# Patient Record
Sex: FEMALE | Race: WHITE | NOT HISPANIC OR LATINO | Employment: FULL TIME | URBAN - NONMETROPOLITAN AREA
[De-identification: names, ages, dates, MRNs, and addresses within clinical notes are randomized per-mention and may not be internally consistent; named-entity substitution may affect disease eponyms.]

---

## 2020-11-28 ENCOUNTER — HOSPITAL ENCOUNTER (EMERGENCY)
Facility: HOSPITAL | Age: 20
Discharge: HOME | End: 2020-11-28
Attending: EMERGENCY MEDICINE
Payer: COMMERCIAL

## 2020-11-28 VITALS
OXYGEN SATURATION: 99 % | HEIGHT: 62 IN | RESPIRATION RATE: 18 BRPM | DIASTOLIC BLOOD PRESSURE: 95 MMHG | WEIGHT: 291 LBS | BODY MASS INDEX: 53.55 KG/M2 | TEMPERATURE: 98 F | SYSTOLIC BLOOD PRESSURE: 142 MMHG | HEART RATE: 80 BPM

## 2020-11-28 DIAGNOSIS — M62.830 MUSCLE SPASM OF BACK: Primary | ICD-10-CM

## 2020-11-28 PROCEDURE — 25000003 PHARM REV CODE 250: Performed by: EMERGENCY MEDICINE

## 2020-11-28 PROCEDURE — 99284 EMERGENCY DEPT VISIT MOD MDM: CPT | Mod: 25

## 2020-11-28 PROCEDURE — 63600175 PHARM REV CODE 636 W HCPCS: Performed by: EMERGENCY MEDICINE

## 2020-11-28 PROCEDURE — 96372 THER/PROPH/DIAG INJ SC/IM: CPT | Mod: 59

## 2020-11-28 RX ORDER — KETOROLAC TROMETHAMINE 30 MG/ML
60 INJECTION, SOLUTION INTRAMUSCULAR; INTRAVENOUS
Status: COMPLETED | OUTPATIENT
Start: 2020-11-28 | End: 2020-11-28

## 2020-11-28 RX ORDER — METHOCARBAMOL 500 MG/1
1000 TABLET, FILM COATED ORAL
Status: COMPLETED | OUTPATIENT
Start: 2020-11-28 | End: 2020-11-28

## 2020-11-28 RX ORDER — METHOCARBAMOL 500 MG/1
1000 TABLET, FILM COATED ORAL 3 TIMES DAILY
Qty: 30 TABLET | Refills: 0 | Status: SHIPPED | OUTPATIENT
Start: 2020-11-28 | End: 2020-12-03

## 2020-11-28 RX ADMIN — KETOROLAC TROMETHAMINE 60 MG: 30 INJECTION, SOLUTION INTRAMUSCULAR at 08:11

## 2020-11-28 RX ADMIN — METHOCARBAMOL 1000 MG: 500 TABLET ORAL at 08:11

## 2020-11-28 NOTE — Clinical Note
"Gayatri Camejowen Masterson was seen and treated in our emergency department on 11/28/2020.  She may return to work on 11/30/2020.       If you have any questions or concerns, please don't hesitate to call.      MARÍA Quintero RN    "

## 2020-11-28 NOTE — ED PROVIDER NOTES
Encounter Date: 11/28/2020       History     Chief Complaint   Patient presents with    Fall     I fell in the bathtub yesterday and hurt my lower back.     This is a 20-year-old white female that fell in the bathtub last night injuring her lower back.  Patient complaining of muscle spasms to the left side of back.  No other injuries.  Steady gait.  No loss of consciousness.  Denies any sensory deficits, ray shoes with bladder or bowel function        Review of patient's allergies indicates:   Allergen Reactions    Amoxicillin-pot clavulanate      rash    Cefprozil      rash    Sulfa (sulfonamide antibiotics)      rash     History reviewed. No pertinent past medical history.  History reviewed. No pertinent surgical history.  History reviewed. No pertinent family history.  Social History     Tobacco Use    Smoking status: Never Smoker   Substance Use Topics    Alcohol use: Not on file    Drug use: Not on file     Review of Systems   Constitutional: Negative for fever.   HENT: Negative for sore throat.    Respiratory: Negative for shortness of breath.    Cardiovascular: Negative for chest pain.   Gastrointestinal: Negative for nausea.   Genitourinary: Negative for dysuria.   Musculoskeletal: Positive for back pain.   Skin: Negative for rash.   Neurological: Negative for weakness.   Hematological: Does not bruise/bleed easily.   All other systems reviewed and are negative.      Physical Exam     Initial Vitals   BP Pulse Resp Temp SpO2   11/28/20 0829 11/28/20 0829 11/28/20 0827 11/28/20 0829 11/28/20 0829   (!) 142/95 80 18 98.1 °F (36.7 °C) 99 %      MAP       --                Physical Exam    Nursing note and vitals reviewed.  Constitutional: She appears well-developed and well-nourished. She is not diaphoretic. No distress.   HENT:   Head: Normocephalic and atraumatic.   Eyes: Conjunctivae and EOM are normal. Pupils are equal, round, and reactive to light.   Neck: Normal range of motion. Neck supple.    Cardiovascular: Normal rate, regular rhythm, normal heart sounds and intact distal pulses.   No murmur heard.  Pulmonary/Chest: Breath sounds normal. No respiratory distress. She has no wheezes. She has no rhonchi. She has no rales. She exhibits no tenderness.   Abdominal: Soft. Bowel sounds are normal.   Musculoskeletal: Normal range of motion. No tenderness or edema.      Comments: Left-sided muscular back spasms noted, neurovascularly intact, no sensory deficits, steady gait, no bony tenderness, no radiation   Neurological: She is alert and oriented to person, place, and time. She has normal strength and normal reflexes. No cranial nerve deficit. GCS score is 15. GCS eye subscore is 4. GCS verbal subscore is 5. GCS motor subscore is 6.   Skin: Skin is warm and dry. Capillary refill takes less than 2 seconds.         ED Course   Procedures  Labs Reviewed - No data to display       Imaging Results    None          Medical Decision Making:   Differential Diagnosis:   Back spasm                             Clinical Impression:     ICD-10-CM ICD-9-CM   1. Muscle spasm of back  M62.830 724.8                          ED Disposition Condition    Discharge Stable        ED Prescriptions     Medication Sig Dispense Start Date End Date Auth. Provider    methocarbamoL (ROBAXIN) 500 MG Tab Take 2 tablets (1,000 mg total) by mouth 3 (three) times daily. for 5 days 30 tablet 11/28/2020 12/3/2020 Jr Wakefield MD        Follow-up Information     Follow up With Specialties Details Why Contact Info    Primary care physician  In 2 days                                         Jr Wakefield MD  11/28/20 8310

## 2020-12-08 ENCOUNTER — LAB VISIT (OUTPATIENT)
Dept: LAB | Facility: HOSPITAL | Age: 20
End: 2020-12-08
Attending: PHYSICIAN ASSISTANT
Payer: COMMERCIAL

## 2020-12-08 DIAGNOSIS — I10 ESSENTIAL HYPERTENSION, MALIGNANT: ICD-10-CM

## 2020-12-08 DIAGNOSIS — R55 SYNCOPE AND COLLAPSE: Primary | ICD-10-CM

## 2020-12-08 LAB
ALBUMIN SERPL BCP-MCNC: 3.6 G/DL (ref 3.5–5.2)
ALP SERPL-CCNC: 64 U/L (ref 55–135)
ALT SERPL W/O P-5'-P-CCNC: 24 U/L (ref 10–44)
ANION GAP SERPL CALC-SCNC: 3 MMOL/L (ref 8–16)
AST SERPL-CCNC: 12 U/L (ref 10–40)
BASOPHILS # BLD AUTO: 0.04 K/UL (ref 0–0.2)
BASOPHILS NFR BLD: 0.7 % (ref 0–1.9)
BILIRUB SERPL-MCNC: 0.5 MG/DL (ref 0.1–1)
BUN SERPL-MCNC: 12 MG/DL (ref 6–20)
CALCIUM SERPL-MCNC: 9 MG/DL (ref 8.7–10.5)
CHLORIDE SERPL-SCNC: 112 MMOL/L (ref 95–110)
CO2 SERPL-SCNC: 28 MMOL/L (ref 23–29)
CREAT SERPL-MCNC: 0.8 MG/DL (ref 0.5–1.4)
DIFFERENTIAL METHOD: ABNORMAL
EOSINOPHIL # BLD AUTO: 0.3 K/UL (ref 0–0.5)
EOSINOPHIL NFR BLD: 5.4 % (ref 0–8)
ERYTHROCYTE [DISTWIDTH] IN BLOOD BY AUTOMATED COUNT: 11.9 % (ref 11.5–14.5)
EST. GFR  (AFRICAN AMERICAN): >60 ML/MIN/1.73 M^2
EST. GFR  (NON AFRICAN AMERICAN): >60 ML/MIN/1.73 M^2
ESTIMATED AVG GLUCOSE: 100 MG/DL (ref 68–131)
FERRITIN SERPL-MCNC: 60 NG/ML (ref 20–300)
GLUCOSE SERPL-MCNC: 94 MG/DL (ref 70–110)
HBA1C MFR BLD HPLC: 5.1 % (ref 4–5.6)
HCT VFR BLD AUTO: 46.6 % (ref 37–48.5)
HGB BLD-MCNC: 16.1 G/DL (ref 12–16)
IMM GRANULOCYTES # BLD AUTO: 0.01 K/UL (ref 0–0.04)
IMM GRANULOCYTES NFR BLD AUTO: 0.2 % (ref 0–0.5)
IRON SATN MFR SERPL: 39 % (ref 20–50)
IRON SERPL-MCNC: 112 UG/DL (ref 30–160)
LYMPHOCYTES # BLD AUTO: 2 K/UL (ref 1–4.8)
LYMPHOCYTES NFR BLD: 33.7 % (ref 18–48)
MCH RBC QN AUTO: 32.6 PG (ref 27–31)
MCHC RBC AUTO-ENTMCNC: 34.5 G/DL (ref 32–36)
MCV RBC AUTO: 94 FL (ref 82–98)
MONOCYTES # BLD AUTO: 0.5 K/UL (ref 0.3–1)
MONOCYTES NFR BLD: 8.4 % (ref 4–15)
NEUTROPHILS # BLD AUTO: 3.1 K/UL (ref 1.8–7.7)
NEUTROPHILS NFR BLD: 51.6 % (ref 38–73)
NRBC BLD-RTO: 0 /100 WBC
PLATELET # BLD AUTO: 270 K/UL (ref 150–350)
PMV BLD AUTO: 10.2 FL (ref 9.2–12.9)
POTASSIUM SERPL-SCNC: 4 MMOL/L (ref 3.5–5.1)
PROT SERPL-MCNC: 6.9 G/DL (ref 6–8.4)
RBC # BLD AUTO: 4.94 M/UL (ref 4–5.4)
SODIUM SERPL-SCNC: 143 MMOL/L (ref 136–145)
T4 FREE SERPL-MCNC: 1.02 NG/DL (ref 0.71–1.51)
TOTAL IRON BINDING CAPACITY: 287 UG/DL (ref 250–450)
TSH SERPL DL<=0.005 MIU/L-ACNC: 1.73 UIU/ML (ref 0.4–4)
VIT B12 SERPL-MCNC: 533 PG/ML (ref 210–950)
WBC # BLD AUTO: 5.96 K/UL (ref 3.9–12.7)

## 2020-12-08 PROCEDURE — 85025 COMPLETE CBC W/AUTO DIFF WBC: CPT

## 2020-12-08 PROCEDURE — 84443 ASSAY THYROID STIM HORMONE: CPT

## 2020-12-08 PROCEDURE — 82607 VITAMIN B-12: CPT

## 2020-12-08 PROCEDURE — 80053 COMPREHEN METABOLIC PANEL: CPT

## 2020-12-08 PROCEDURE — 83540 ASSAY OF IRON: CPT

## 2020-12-08 PROCEDURE — 84439 ASSAY OF FREE THYROXINE: CPT

## 2020-12-08 PROCEDURE — 82306 VITAMIN D 25 HYDROXY: CPT

## 2020-12-08 PROCEDURE — 83036 HEMOGLOBIN GLYCOSYLATED A1C: CPT

## 2020-12-08 PROCEDURE — 36415 COLL VENOUS BLD VENIPUNCTURE: CPT

## 2020-12-08 PROCEDURE — 82728 ASSAY OF FERRITIN: CPT

## 2020-12-09 LAB — 25(OH)D3+25(OH)D2 SERPL-MCNC: 9 NG/ML (ref 30–96)

## 2021-11-01 ENCOUNTER — OFFICE VISIT (OUTPATIENT)
Dept: URGENT CARE | Facility: CLINIC | Age: 21
End: 2021-11-01
Payer: COMMERCIAL

## 2021-11-01 VITALS
RESPIRATION RATE: 16 BRPM | DIASTOLIC BLOOD PRESSURE: 96 MMHG | SYSTOLIC BLOOD PRESSURE: 151 MMHG | BODY MASS INDEX: 53.55 KG/M2 | HEIGHT: 62 IN | TEMPERATURE: 100 F | WEIGHT: 291 LBS | OXYGEN SATURATION: 99 % | HEART RATE: 93 BPM

## 2021-11-01 DIAGNOSIS — R03.0 ELEVATED BLOOD PRESSURE READING: ICD-10-CM

## 2021-11-01 DIAGNOSIS — R06.02 SOB (SHORTNESS OF BREATH): ICD-10-CM

## 2021-11-01 DIAGNOSIS — J02.9 PHARYNGITIS, UNSPECIFIED ETIOLOGY: Primary | ICD-10-CM

## 2021-11-01 DIAGNOSIS — J06.9 UPPER RESPIRATORY TRACT INFECTION, UNSPECIFIED TYPE: ICD-10-CM

## 2021-11-01 LAB
CTP QC/QA: YES
SARS-COV-2 RDRP RESP QL NAA+PROBE: NEGATIVE

## 2021-11-01 PROCEDURE — 3077F PR MOST RECENT SYSTOLIC BLOOD PRESSURE >= 140 MM HG: ICD-10-PCS | Mod: CPTII,S$GLB,, | Performed by: NURSE PRACTITIONER

## 2021-11-01 PROCEDURE — 3077F SYST BP >= 140 MM HG: CPT | Mod: CPTII,S$GLB,, | Performed by: NURSE PRACTITIONER

## 2021-11-01 PROCEDURE — 3080F DIAST BP >= 90 MM HG: CPT | Mod: CPTII,S$GLB,, | Performed by: NURSE PRACTITIONER

## 2021-11-01 PROCEDURE — 3080F PR MOST RECENT DIASTOLIC BLOOD PRESSURE >= 90 MM HG: ICD-10-PCS | Mod: CPTII,S$GLB,, | Performed by: NURSE PRACTITIONER

## 2021-11-01 PROCEDURE — 1159F PR MEDICATION LIST DOCUMENTED IN MEDICAL RECORD: ICD-10-PCS | Mod: CPTII,S$GLB,, | Performed by: NURSE PRACTITIONER

## 2021-11-01 PROCEDURE — 99203 OFFICE O/P NEW LOW 30 MIN: CPT | Mod: S$GLB,,, | Performed by: NURSE PRACTITIONER

## 2021-11-01 PROCEDURE — U0002: ICD-10-PCS | Mod: QW,S$GLB,, | Performed by: NURSE PRACTITIONER

## 2021-11-01 PROCEDURE — 3008F PR BODY MASS INDEX (BMI) DOCUMENTED: ICD-10-PCS | Mod: CPTII,S$GLB,, | Performed by: NURSE PRACTITIONER

## 2021-11-01 PROCEDURE — 1160F RVW MEDS BY RX/DR IN RCRD: CPT | Mod: CPTII,S$GLB,, | Performed by: NURSE PRACTITIONER

## 2021-11-01 PROCEDURE — U0002 COVID-19 LAB TEST NON-CDC: HCPCS | Mod: QW,S$GLB,, | Performed by: NURSE PRACTITIONER

## 2021-11-01 PROCEDURE — 1160F PR REVIEW ALL MEDS BY PRESCRIBER/CLIN PHARMACIST DOCUMENTED: ICD-10-PCS | Mod: CPTII,S$GLB,, | Performed by: NURSE PRACTITIONER

## 2021-11-01 PROCEDURE — 1159F MED LIST DOCD IN RCRD: CPT | Mod: CPTII,S$GLB,, | Performed by: NURSE PRACTITIONER

## 2021-11-01 PROCEDURE — 99203 PR OFFICE/OUTPT VISIT, NEW, LEVL III, 30-44 MIN: ICD-10-PCS | Mod: S$GLB,,, | Performed by: NURSE PRACTITIONER

## 2021-11-01 PROCEDURE — 3008F BODY MASS INDEX DOCD: CPT | Mod: CPTII,S$GLB,, | Performed by: NURSE PRACTITIONER

## 2021-11-01 RX ORDER — AZITHROMYCIN 250 MG/1
TABLET, FILM COATED ORAL
Qty: 6 TABLET | Refills: 0 | Status: SHIPPED | OUTPATIENT
Start: 2021-11-01 | End: 2021-11-06

## 2024-04-23 ENCOUNTER — OFFICE VISIT (OUTPATIENT)
Dept: URGENT CARE | Facility: CLINIC | Age: 24
End: 2024-04-23
Payer: OTHER MISCELLANEOUS

## 2024-04-23 VITALS
SYSTOLIC BLOOD PRESSURE: 133 MMHG | RESPIRATION RATE: 19 BRPM | HEIGHT: 63 IN | DIASTOLIC BLOOD PRESSURE: 76 MMHG | OXYGEN SATURATION: 97 % | TEMPERATURE: 99 F | HEART RATE: 83 BPM | BODY MASS INDEX: 51.91 KG/M2 | WEIGHT: 293 LBS

## 2024-04-23 DIAGNOSIS — S89.92XA INJURY OF LEFT KNEE, INITIAL ENCOUNTER: ICD-10-CM

## 2024-04-23 DIAGNOSIS — Z02.6 ENCOUNTER RELATED TO WORKER'S COMPENSATION CLAIM: Primary | ICD-10-CM

## 2024-04-23 PROCEDURE — 73562 X-RAY EXAM OF KNEE 3: CPT | Mod: LT,S$GLB,, | Performed by: RADIOLOGY

## 2024-04-23 PROCEDURE — 99203 OFFICE O/P NEW LOW 30 MIN: CPT | Mod: S$GLB,,,

## 2024-04-23 RX ORDER — NAPROXEN 500 MG/1
500 TABLET ORAL 2 TIMES DAILY WITH MEALS
Qty: 14 TABLET | Refills: 0 | Status: SHIPPED | OUTPATIENT
Start: 2024-04-23 | End: 2024-04-30

## 2024-04-23 NOTE — PROGRESS NOTES
Subjective:      Patient ID: Gayatri Masterson is a 24 y.o. female.    Chief Complaint: Knee Injury (Left)    Patient's place of employment - [employer]  Patient's job title - Care Giver  Date of injury - 4/23/24  Body part injured including left or right - left knee  Injury Mechanism - fall from knee popping out of socket  What they were doing when they got hurt - going to stand up and fell  What they did immediately after - boss was notified   Pain scale right now - 8/10    Knee Injury  Associated symptoms include arthralgias.       Musculoskeletal:  Positive for joint pain and abnormal ROM of joint.     Objective:     Physical Exam  Vitals and nursing note reviewed.   Constitutional:       General: She is not in acute distress.     Appearance: Normal appearance. She is not ill-appearing, toxic-appearing or diaphoretic.   HENT:      Head: Normocephalic and atraumatic.      Nose: Nose normal.      Mouth/Throat:      Mouth: Mucous membranes are moist.   Eyes:      Conjunctiva/sclera: Conjunctivae normal.   Cardiovascular:      Rate and Rhythm: Normal rate.   Pulmonary:      Effort: Pulmonary effort is normal. No respiratory distress.   Musculoskeletal:      Cervical back: No rigidity.      Left knee: Bony tenderness present. No crepitus. Decreased range of motion. Tenderness (global) present.      Comments: Patient unable to fully flex knee. Pain with valgus stress to knee. Patient ambulates favoring right leg.    Skin:     General: Skin is warm and dry.      Capillary Refill: Capillary refill takes less than 2 seconds.   Neurological:      Mental Status: She is alert.   Psychiatric:         Behavior: Behavior normal.         Thought Content: Thought content normal.         Judgment: Judgment normal.        XR KNEE 3 VIEW LEFT    Result Date: 4/23/2024  EXAMINATION: XR KNEE 3 VIEW LEFT CLINICAL HISTORY: Unspecified injury of left lower leg, initial encounter TECHNIQUE: AP, lateral, and Merchant views of the left  knee were performed. COMPARISON: None FINDINGS: Bones are well mineralized.  Overall alignment is within normal limits.  No displaced fracture, dislocation or destructive osseous process.  Minimal degenerative change at the posterior patella.  No large suprapatellar joint effusion.  No subcutaneous emphysema or radiodense retained foreign body.     No acute displaced fracture-dislocation identified. Electronically signed by: Frantz Ley MD Date:    04/23/2024 Time:    15:47     Assessment:      1. Encounter related to worker's compensation claim    2. Injury of left knee, initial encounter      Plan:       Medications Ordered This Encounter   Medications    naproxen (NAPROSYN) 500 MG tablet     Sig: Take 1 tablet (500 mg total) by mouth 2 (two) times daily with meals. for 7 days     Dispense:  14 tablet     Refill:  0   Reviewed left knee xray in PACS: no displaced fracture or dislocation. Posterior patella has degenerative changes posteriorly. Discussed results with patient in clinic.   Patient declined crutches in clinic.   Patient Instructions: Attention not to aggravate affected area, Apply ice 24-48 hours then apply heat/warm soaks, Elevated affected area   Restrictions:  (Work limitations to be determined by [place] on scheduled follow-up next business day)  Follow up in about 5 days (around 4/28/2024), or if symptoms worsen or fail to improve.

## 2024-04-23 NOTE — LETTER
[facility]  [address], [address]  [place] [address]  Phone: [phone]  Fax: [phone]  [facility]: [id number]    Pt Name: Gayatri Mastesron  Injury Date: 04/23/2024   Employee ID: [id number] Date of First Treatment: 04/23/2024   Company: [employer]      Appointment Time: 01:55 PM Arrived: 1:53 PM    Provider: Beulah Blanc PA-C Time Out: 4:05 PM      Office Treatment:   1. Encounter related to worker's compensation claim    2. Injury of left knee, initial encounter      Medications Ordered This Encounter   Medications    naproxen (NAPROSYN) 500 MG tablet      Patient Instructions: Attention not to aggravate affected area, Apply ice 24-48 hours then apply heat/warm soaks, Elevated affected area    Restrictions:  (Work limitations to be determined by [place] on scheduled follow-up next business day)     Return Appointment: 4/28/2024 at 11:00 AM.     LISA

## 2024-04-23 NOTE — PATIENT INSTRUCTIONS
You must understand that you have received treatment at an Urgent Care facility only, and that you may be  released before all of your medical problems are known or treated. Urgent Care facilities are not equipped to  handle life threatening emergencies. It is recommended that you seek care at an Emergency Department for  further evaluation of worsening or concerning symptoms, or possibly life threatening conditions as  discussed.    Rest, elevated, apply ice to knee. Use compression to help with swelling    Patient Instructions   1.  Take all medications as directed. If you have been prescribed antibiotics, make sure to complete them.   2.  Rest and keep yourself/patient well hydrated. For adults, it is recommended to drink at least 8-10 glasses of water daily.   3.  For patients above 6 months of age who are not allergic to and are not on anticoagulants, you can alternate Tylenol and Motrin every 4-6 hours for fever above 100.4F and/or pain.  For patients less than 6 months of age, allergic to or intolerant to NSAIDS, have gastritis, gastric ulcers, or history of GI bleeds, are pregnant, or are on anticoagulant therapy, you can take Tylenol every 4 hours as needed for fever above 100.4F and/or pain.   4. You should schedule a follow-up appointment with your Primary Care Provider/Pediatrician for recheck in 2-3 days or as directed at this visit.   5.  If your condition fails to improve in a timely manner, you should receive another evaluation by your Primary Care Provider/Pediatrician to discuss your concerns or return to urgent care for a recheck.  If your condition worsens at any time, you should report immediately to your nearest Emergency Department for further evaluation. **You must understand that you have received Urgent Care treatment only and that you may be released before all of your medical problems are known or treated. You, the patient, are responsible to arrange for follow-up care as instructed.

## 2024-04-23 NOTE — LETTER
[facility] [place]  [address], [address]  [place] [address]  Phone: [phone]  Fax: [phone]  [facility]: [id number]    Pt Name: Gayatri Masterson  Injury Date: 04/23/2024   Employee ID:  Date of First Treatment: 04/23/2024   Company: [employer]      Appointment Time: 01:55 PM Arrived: 1:53 pm    Provider: Beulah Blanc PA-C Time Out: 4:00 pm      Office Treatment:   1. Encounter related to worker's compensation claim    2. Injury of left knee, initial encounter      Medications Ordered This Encounter   Medications    naproxen (NAPROSYN) 500 MG tablet      Patient Instructions: Attention not to aggravate affected area, Apply ice 24-48 hours then apply heat/warm soaks, Elevated affected area    Restrictions:  (Work limitations to be determined by [place] on scheduled follow-up next business day)     Return Appointment: Visit date not found at 04/28/2024 FOR 11:00 AM     CS

## 2024-04-28 ENCOUNTER — OFFICE VISIT (OUTPATIENT)
Dept: URGENT CARE | Facility: CLINIC | Age: 24
End: 2024-04-28
Payer: OTHER MISCELLANEOUS

## 2024-04-28 VITALS
TEMPERATURE: 98 F | SYSTOLIC BLOOD PRESSURE: 152 MMHG | RESPIRATION RATE: 17 BRPM | BODY MASS INDEX: 51.91 KG/M2 | HEIGHT: 63 IN | HEART RATE: 77 BPM | WEIGHT: 293 LBS | DIASTOLIC BLOOD PRESSURE: 82 MMHG | OXYGEN SATURATION: 97 %

## 2024-04-28 DIAGNOSIS — Z02.6 ENCOUNTER RELATED TO WORKER'S COMPENSATION CLAIM: Primary | ICD-10-CM

## 2024-04-28 DIAGNOSIS — S83.92XD SPRAIN OF LEFT KNEE, UNSPECIFIED LIGAMENT, SUBSEQUENT ENCOUNTER: ICD-10-CM

## 2024-04-28 PROCEDURE — 99214 OFFICE O/P EST MOD 30 MIN: CPT | Mod: S$GLB,,, | Performed by: FAMILY MEDICINE

## 2024-04-28 RX ORDER — NAPROXEN 500 MG/1
500 TABLET ORAL 2 TIMES DAILY WITH MEALS
Qty: 20 TABLET | Refills: 0 | Status: SHIPPED | OUTPATIENT
Start: 2024-04-28

## 2024-04-28 NOTE — PROGRESS NOTES
Subjective:      Patient ID: Gayatri Masterson is a 24 y.o. female.    Chief Complaint: Knee Injury    Patient's place of employment - [employer]  Patient's job title - caregiver  Date of Injury - 04/23/2024  Body part injured - Left knee  Current work status per last visit - Off  Improved, same, or worse - same  Pain Scale right now (1-10) -  7    Knee Injury      Constitution: Negative.   HENT: Negative.     Cardiovascular: Negative.    Eyes: Negative.    Respiratory: Negative.     Gastrointestinal: Negative.    Endocrine: negative.   Genitourinary: Negative.    Musculoskeletal: Negative.    Skin: Negative.    Allergic/Immunologic: Negative.    Neurological: Negative.    Hematologic/Lymphatic: Negative.    Psychiatric/Behavioral: Negative.       Objective:     Physical Exam  Vitals and nursing note reviewed.   Constitutional:       General: She is not in acute distress.     Appearance: Normal appearance. She is well-developed. She is not ill-appearing, toxic-appearing or diaphoretic.   HENT:      Head: Normocephalic and atraumatic. No abrasion, contusion or laceration.      Jaw: No trismus.      Right Ear: Hearing, tympanic membrane, ear canal and external ear normal. No hemotympanum.      Left Ear: Hearing, tympanic membrane, ear canal and external ear normal. No hemotympanum.      Nose: Nose normal. No nasal deformity, mucosal edema or rhinorrhea.      Right Sinus: No maxillary sinus tenderness or frontal sinus tenderness.      Left Sinus: No maxillary sinus tenderness or frontal sinus tenderness.      Mouth/Throat:      Dentition: Normal dentition.      Pharynx: Uvula midline. No posterior oropharyngeal erythema or uvula swelling.   Eyes:      General: Lids are normal. No scleral icterus.        Right eye: No discharge.         Left eye: No discharge.      Conjunctiva/sclera: Conjunctivae normal.      Pupils: Pupils are equal, round, and reactive to light.      Comments: Sclera clear bilat   Neck:       Trachea: Trachea and phonation normal. No tracheal deviation.   Cardiovascular:      Rate and Rhythm: Normal rate and regular rhythm.      Pulses: Normal pulses.      Heart sounds: Normal heart sounds.   Pulmonary:      Effort: Pulmonary effort is normal. No respiratory distress.      Breath sounds: Normal breath sounds.   Abdominal:      General: Bowel sounds are normal. There is no distension.      Palpations: Abdomen is soft. There is no mass or pulsatile mass.      Tenderness: There is no abdominal tenderness.   Musculoskeletal:         General: No deformity. Normal range of motion.      Cervical back: Full passive range of motion without pain, normal range of motion and neck supple. No rigidity. No spinous process tenderness or muscular tenderness.        Legs:    Skin:     General: Skin is warm and dry.      Capillary Refill: Capillary refill takes less than 2 seconds.      Coloration: Skin is not pale.      Findings: No abrasion, bruising, burn, ecchymosis or laceration.   Neurological:      Mental Status: She is alert and oriented to person, place, and time.      GCS: GCS eye subscore is 4. GCS verbal subscore is 5. GCS motor subscore is 6.      Cranial Nerves: No cranial nerve deficit.      Sensory: No sensory deficit.      Motor: No abnormal muscle tone or seizure activity.      Coordination: Coordination normal.   Psychiatric:         Speech: Speech normal.         Behavior: Behavior normal. Behavior is cooperative.         Thought Content: Thought content normal.         Judgment: Judgment normal.        Assessment:      1. Encounter related to worker's compensation claim    2. Sprain of left knee, unspecified ligament, subsequent encounter      Plan:       Medications Ordered This Encounter   Medications    naproxen (NAPROSYN) 500 MG tablet     Sig: Take 1 tablet (500 mg total) by mouth 2 (two) times daily with meals.     Dispense:  20 tablet     Refill:  0     Patient Instructions: Attention not to  aggravate affected area, Apply ice 24-48 hours then apply heat/warm soaks   Restrictions: Sit or stand as needed, Avoid frequent bending/lifting/twisting, Avoid climbing/kneeling/squatting, No lifting/pushing/pulling more than 10 lbs, Sit down work only, Sedentary work only (No walking on L leg, no walking without crutches.)  Follow up in about 4 days (around 5/2/2024) for Recheck.    Please drink plenty of fluids.  Please get plenty of rest.  Please return here or go to the Emergency Department for any concerns or worsening of condition.  If you were prescribed a narcotic medication, do not drive or operate heavy equipment or machinery while taking these medications.  If you were not prescribed an anti-inflammatory medication, and if you do not have any history of stomach/intestinal ulcers, or kidney disease, or are not taking a blood thinner such as Coumadin, Plavix, Pradaxa, Eloquis, or Xaralta for example, it is OK to take over the counter Ibuprofen or Advil or Motrin or Aleve as directed.  Do not take these medications on an empty stomach.    Rest, ice, compression and elevation to the affected joint as needed.    If you were given crutches or they were recommended, use them for ambulation or walking until your follow up visit or recheck.    If you  smoke, please stop smoking.       Please follow up with your primary care doctor or specialist as needed.    Gardenia Velarde MD  [phone]    You must understand that you have received treatment at an Urgent Care facility only, and that you may be  released before all of your medical problems are known or treated. Urgent Care facilities are not equipped to  handle life threatening emergencies. It is recommended that you seek care at an Emergency Department for  further evaluation of worsening or concerning symptoms, or possibly life threatening conditions as  discussed.

## 2024-04-28 NOTE — PATIENT INSTRUCTIONS
Please drink plenty of fluids.  Please get plenty of rest.  Please return here or go to the Emergency Department for any concerns or worsening of condition.  If you were prescribed a narcotic medication, do not drive or operate heavy equipment or machinery while taking these medications.  If you were not prescribed an anti-inflammatory medication, and if you do not have any history of stomach/intestinal ulcers, or kidney disease, or are not taking a blood thinner such as Coumadin, Plavix, Pradaxa, Eloquis, or Xaralta for example, it is OK to take over the counter Ibuprofen or Advil or Motrin or Aleve as directed.  Do not take these medications on an empty stomach.    Rest, ice, compression and elevation to the affected joint as needed.    If you were given crutches or they were recommended, use them for ambulation or walking until your follow up visit or recheck.    If you  smoke, please stop smoking.       Please follow up with your primary care doctor or specialist as needed.    Gardenia Velarde MD  [phone]    You must understand that you have received treatment at an Urgent Care facility only, and that you may be  released before all of your medical problems are known or treated. Urgent Care facilities are not equipped to  handle life threatening emergencies. It is recommended that you seek care at an Emergency Department for  further evaluation of worsening or concerning symptoms, or possibly life threatening conditions as  discussed.    Knee Sprain    A sprain is an injury to the ligaments or capsule that holds a joint together. There are no broken bones. Most sprains take 3 to 6 weeks to heal. If it a severe sprain where the ligament is completely torn, it can take months to recover.  Most knee sprains are treated with a splint, knee immobilizer brace, or elastic wrap for support. Severe sprains may require surgery.  Home care  Stay off the injured leg as much as possible until you can walk on it  without pain. If you have a lot of pain with walking, crutches or a walker may be prescribed. (These can be rented or purchased at many pharmacies and surgical or orthopedic supply stores). Follow your healthcare provider's advice about when to begin putting weight on that leg.  Keep your leg elevated to reduce pain and swelling. When sleeping, place a pillow under the injured leg. When sitting, support the injured leg so it is level with your waist. This is very important during the first 48 hours.  Apply an ice pack over the injured area for 15 to 20 minutes every 3 to 6 hours. You should do this for the first 24 to 48 hours. You can make an ice pack by filling a plastic bag that seals at the top with ice cubes and then wrapping it with a thin towel. Continue to use ice packs for relief of pain and swelling as needed. As the ice melts, be careful to avoid getting your wrap, splint, or cast wet. After 48 hours, apply heat (warm shower or warm bath) for 15 to 20 minutes several times a day, or alternate ice and heat. You can place the ice pack directly over the splint. If you have to wear a hook-and-loop knee brace, you can open it to apply the ice pack, or heat, directly to the knee. Never put ice directly on the skin. Always wrap the ice in a towel or other type of cloth.  You may use over-the-counter pain medicine to control pain, unless another pain medicine was prescribed.If you have chronic liver or kidney disease or ever had a stomach ulcer or GI bleeding, talk with your healthcare provider before using these medicines.  If you were given a splint, keep it completely dry at all times. Bathe with your splint out of the water, protected with 2 large plastic bags, rubber-banded at the top end. If a fiberglass splint gets wet, you can dry it with a hair dryer. If you have a hook-and-loop knee brace, you can remove this to bathe, unless told otherwise.  Follow-up care  Follow up with your doctor as advised. Any  X-rays you had today dont show any broken bones, breaks, or fractures. Sometimes fractures dont show up on the first X-ray. Bruises and sprains can sometimes hurt as much as a fracture. These injuries can take time to heal completely. If your symptoms dont improve or they get worse, talk with your doctor. You may need a repeat X-ray. If X-rays were taken, you will be told of any new findings that may affect your care.  Call 911  Call 911 if you have:   Shortness of breath   Chest pain  When to seek medical advice  Call your healthcare provider right away if any of these occur:  The splint or knee immobilizer brace becomes wet or soft  The fiberglass cast or splint remains wet for more than 24 hours  Pain or swelling increases  The injured leg or toes become cold, blue, numb, or tingly  Date Last Reviewed: 11/20/2015  © 4032-5203 [employer]. [address] [place] [address]. All rights reserved. This information is not intended as a substitute for professional medical care. Always follow your healthcare professional's instructions.        Knee Pain  Knee pain is very common. Its especially common in active people who put a lot of pressure on their knees, like runners. It affects women more often than men.  Your kneecap (patella) is a thick, round bone. It covers and protects the front portion of your knee joint. It moves along a groove in your thighbone (femur) as part of the patellofemoral joint. A layer of cartilage surrounds the underside of your kneecap. This layer protects it from grinding against your femur.  When this cartilage softens and breaks down, it can cause knee pain. This is partly because of repetitive stress. The stress irritates the lining of the joint. This causes pain in the underlying bone.  What causes knee pain?  Many things can cause knee pain. You may have more than one cause. Some of these include:  Overuse of the knee joint  The kneecap doesnt line up with the  tissue around it  Damage to small nerves in the area  Damage to the ligament-like structure that holds the kneecap in place (retinaculum)  Breakdown of the bone under the cartilage  Swelling in the soft tissues around the kneecap  Injury  You might be more likely to have knee pain if you:  Exercise a lot  Recently increased the intensity of your workouts  Have a body mass index (BMI) greater than 25  Have poor alignment of your kneecap  Walk with your feet turned overly outward or inward  Have weakness in surrounding muscle groups (inner quad or hip adductor muscles)  Have too much tightness in surrounding muscle groups (hamstrings or iliotibial band)  Have a recent history of injury to the area  Are female  Symptoms of knee pain  This type of knee pain is a dull, aching pain in the front of the knee in the area under and around the kneecap. This pain may start quickly or slowly. Your pain might be worse when you squat, run, or sit for a long time. You might also sometimes feel like your knee is giving out. You may have symptoms in one or both of your knees.  Diagnosing knee pain  Your health care provider will ask about your medical history and your symptoms. Be sure to describe any activities that make your knee pain worse. He or she will look at your knee. This will include tests of your range of motion, strength, and areas of pain of your knee. Your knee alignment will be checked.  Your health care provider will need to rule out other causes of your knee pain, such as arthritis. You may need an imaging test, such as an X-ray or MRI.  Treatment for knee pain  Treatments that can help ease your symptoms may include:  Avoiding activities for a while that make your pain worse, returning to activity over time  Icing the outside of your knee when it causes you pain  Taking over-the-counter pain medicine  Wearing a knee brace or taping your knee to support it  Wearing special shoe inserts to help keep your feet in the  proper alignment  Doing special exercises to stretch and strengthen the muscles around your hip and your knee  These steps help most people manage knee pain. But some cases of knee pain need to be treated with surgery. You may need surgery right away. Or you may need it later if other treatments dont work. Your health care provider may refer you to an orthopedic surgeon. He or she will talk with you about your choices.  Preventing knee pain  Losing weight and correcting excess muscle tightness or muscle weakness may help lower your risk.  In some cases, you can prevent knee pain. To help prevent a flare-up of knee pain, you do these things:  Regularly do all the exercises your doctor or physical therapist advises  Support your knee as advised by your doctor or physical therapist  Increase training gradually, and ease up on training when needed  Have an expert check your gait for running or other sporting activities  Stretch properly before and after exercise  Replace your running shoes regularly  Lose excess weight     When to call your health care provider  Call your health care provider right away if:  Your symptoms dont get better after a few weeks of treatment  You have any new symptoms   Date Last Reviewed: 3/19/2015  © 6927-3856 [employer]. [address] [place] [address]. All rights reserved. This information is not intended as a substitute for professional medical care. Always follow your healthcare professional's instructions.      Discharge Instructions: Using Crutches (Non-Weight-Bearing)  Your healthcare provider has prescribed crutches for you. A healthy leg can support your body weight, but when you have an injured leg or foot, you need to keep weight off it. The swing to method of walking, sometimes called gait, is easy to learn and takes less arm strength and balance. The swing through gait takes more practice, but it moves you farther with each step and is less tiring  overall. Start with swing to and progress to swing through when instructed.      Before you use crutches  Be prepared:  Remove throw rugs, electrical cords, and anything else that may cause you to fall.  Arrange your household to keep the items you need handy. Keep everything else out of the way.  Find a backpack, evans pack, or apron, or use pockets to carry things. This will help you keep your hands free.  Standing with crutches  Use the balanced standing (tripod) position when you start or end a movement. Also use it whenever youre standing for a length of time.  Move your crutches in front of you about 12 inches.  Hold the injured (or weaker) foot off the floor.  Find your balance.  Be sure not to rest your armpits on the pads of the crutches.  Walking with crutches  Tips include the following:   Start in a balanced standing (tripod) position.  Squeeze the crutch pads against the sides of your chest.  The bottom tips of the crutches should be wide enough apart for you to move easily between them.  Support your weight on your hands, not on your armpits.  Swing to gait  With your crutches in front of you, press down on the handgrips.  Lift your good (stronger) foot and swing your body up to the crutches.  Land on your good foot, between your crutches.  Keep the knee of your injured or weaker foot slightly bent.  Reach forward and out with the crutches to begin the next step.  Swing through gait  With your crutches in front of you, press down on the handgrips.  Lift your good (stronger) foot and swing your body through the crutches.  Land on your good foot, about 12 inches in front of the crutches.  Keep the knee of your injured leg slightly bent.  Reach forward and out with the crutches to begin the next step.  Follow-up  Make a follow-up appointment as directed by your healthcare provider.     When to call your healthcare provider  Call your healthcare provider right away if you have any of the  following:  Sudden or increased shortness of breath  Sudden chest pain  Fever above 100.4°F (38.0°C)  Increasing redness, tenderness, or swelling at the incision site or in the injured limb  Drainage from the incision or injured limb  Opening of the incision or injury  Localized chest pain with coughing   Date Last Reviewed: 8/1/2016  © 2823-8789 [employer]. [address] [place] [address]. All rights reserved. This information is not intended as a substitute for professional medical care. Always follow your healthcare professional's instructions.

## 2024-04-28 NOTE — LETTER
[facility] [place]  [address], [address]  [place] [address]  Phone: [phone]  Fax: [phone]  [facility]: [email or web address]    Pt Name: Gayatri Masterson  Injury Date: 04/23/2024   Employee ID: [id number] Date of First Treatment: 04/28/2024   Company: [employer]      Appointment Time: 10:45 AM Arrived: 10:28 am   Provider: Vini Price MD Time Out: 11:40 am     Office Treatment:   1. Encounter related to worker's compensation claim    2. Sprain of left knee, unspecified ligament, subsequent encounter      Medications Ordered This Encounter   Medications    naproxen (NAPROSYN) 500 MG tablet      Patient Instructions: Attention not to aggravate affected area, Apply ice 24-48 hours then apply heat/warm soaks    Restrictions: Sit or stand as needed, Avoid frequent bending/lifting/twisting, Avoid climbing/kneeling/squatting, No lifting/pushing/pulling more than 10 lbs, Sit down work only, Sedentary work only (No walking on L leg, no walking without crutches.)     Return Appointment:       5/2/2024 @ 11 am      BP         
[facility] [place]  [address], [address]  [place] [address]  Phone: [phone]  Fax: [phone]  [facility]: [email or web address]    Pt Name: Gayatri Masterson  Injury Date: 04/23/2024   Employee ID:  Date of First Treatment: 04/28/2024   Company: [employer]      Appointment Time: 10:45 AM Arrived: ***   Provider: Vini Price MD Time Out:***     Office Treatment:   1. Encounter related to worker's compensation claim    2. Sprain of left knee, unspecified ligament, subsequent encounter      Medications Ordered This Encounter   Medications    naproxen (NAPROSYN) 500 MG tablet      Patient Instructions: Attention not to aggravate affected area, Apply ice 24-48 hours then apply heat/warm soaks    Restrictions: Sit or stand as needed, Avoid frequent bending/lifting/twisting, Avoid climbing/kneeling/squatting, No lifting/pushing/pulling more than 10 lbs, Sit down work only, Sedentary work only (No walking on L leg, no walking without crutches.)     Return Appointment: Visit date not found at ***           
RIGHT ARM/yes

## 2024-05-02 ENCOUNTER — OFFICE VISIT (OUTPATIENT)
Dept: URGENT CARE | Facility: CLINIC | Age: 24
End: 2024-05-02
Payer: OTHER MISCELLANEOUS

## 2024-05-02 VITALS
DIASTOLIC BLOOD PRESSURE: 90 MMHG | HEIGHT: 63 IN | SYSTOLIC BLOOD PRESSURE: 150 MMHG | TEMPERATURE: 99 F | RESPIRATION RATE: 17 BRPM | WEIGHT: 293 LBS | BODY MASS INDEX: 51.91 KG/M2 | HEART RATE: 82 BPM | OXYGEN SATURATION: 98 %

## 2024-05-02 DIAGNOSIS — Z02.6 ENCOUNTER RELATED TO WORKER'S COMPENSATION CLAIM: Primary | ICD-10-CM

## 2024-05-02 DIAGNOSIS — S83.92XD SPRAIN OF LEFT KNEE, UNSPECIFIED LIGAMENT, SUBSEQUENT ENCOUNTER: ICD-10-CM

## 2024-05-02 PROCEDURE — 99214 OFFICE O/P EST MOD 30 MIN: CPT | Mod: S$GLB,,, | Performed by: FAMILY MEDICINE

## 2024-05-02 RX ORDER — NAPROXEN 500 MG/1
500 TABLET ORAL 2 TIMES DAILY WITH MEALS
Qty: 20 TABLET | Refills: 0 | Status: SHIPPED | OUTPATIENT
Start: 2024-05-02

## 2024-05-02 NOTE — LETTER
[facility] [place]  [address], [address]  [place] [address]  Phone: [phone]  Fax: [phone]  [facility]: [email or web address]    Pt Name: Gayatri Masterson  Injury Date: 04/23/2024   Employee ID: [id number] Date of First Treatment: 05/02/2024   Company: [employer]      Appointment Time: 10:55 AM Arrived: 11:00 AM   Provider: Vini Price MD Time Out: 11:55 AM     Office Treatment:   1. Encounter related to worker's compensation claim    2. Sprain of left knee, unspecified ligament, subsequent encounter      Medications Ordered This Encounter   Medications    naproxen (NAPROSYN) 500 MG tablet      Patient Instructions: Attention not to aggravate affected area, Apply ice 24-48 hours then apply heat/warm soaks, Referral to specialist to be scheduled, once authorized, Use Crutches as directed    Restrictions: Sit or stand as needed, Avoid frequent bending/lifting/twisting, Avoid climbing/kneeling/squatting, No lifting/pushing/pulling more than 10 lbs, No lifting/pushing/pulling more than 25 lbs, Sit down work only, No Prolonged standing/walking, Discharged to Ortho/Neuro/Opthomologist/Surgeon (No walking without crutches.)     Return Appointment: 05/16/2024 @ 1:00 PM.      VIKAS

## 2024-05-02 NOTE — PROGRESS NOTES
Subjective:      Patient ID: Gayatri Masterson is a 24 y.o. female.    Chief Complaint: Knee Injury    Patient's place of employment - [employer]  Patient's job title - Caregiver  Date of Injury - 4/23/2024  Body part injured - Left knee  Current work status per last visit - Off  Improved, same, or worse - Same  Pain Scale right now (1-10) -  7    Knee Injury      Constitution: Negative.   HENT: Negative.     Cardiovascular: Negative.    Eyes: Negative.    Respiratory: Negative.     Gastrointestinal: Negative.    Endocrine: negative.   Genitourinary: Negative.    Musculoskeletal: Negative.    Skin: Negative.    Allergic/Immunologic: Negative.    Neurological: Negative.    Hematologic/Lymphatic: Negative.    Psychiatric/Behavioral: Negative.       Objective:     Physical Exam  Vitals and nursing note reviewed.   Constitutional:       General: She is not in acute distress.     Appearance: Normal appearance. She is well-developed. She is not ill-appearing, toxic-appearing or diaphoretic.   HENT:      Head: Normocephalic and atraumatic. No abrasion, contusion or laceration.      Jaw: No trismus.      Right Ear: Hearing, tympanic membrane, ear canal and external ear normal. No hemotympanum.      Left Ear: Hearing, tympanic membrane, ear canal and external ear normal. No hemotympanum.      Nose: Nose normal. No nasal deformity, mucosal edema or rhinorrhea.      Right Sinus: No maxillary sinus tenderness or frontal sinus tenderness.      Left Sinus: No maxillary sinus tenderness or frontal sinus tenderness.      Mouth/Throat:      Dentition: Normal dentition.      Pharynx: Uvula midline. No posterior oropharyngeal erythema or uvula swelling.   Eyes:      General: Lids are normal. No scleral icterus.        Right eye: No discharge.         Left eye: No discharge.      Conjunctiva/sclera: Conjunctivae normal.      Pupils: Pupils are equal, round, and reactive to light.      Comments: Sclera clear bilat   Neck:      Trachea:  Trachea and phonation normal. No tracheal deviation.   Cardiovascular:      Rate and Rhythm: Normal rate and regular rhythm.      Pulses: Normal pulses.      Heart sounds: Normal heart sounds.   Pulmonary:      Effort: Pulmonary effort is normal. No respiratory distress.      Breath sounds: Normal breath sounds.   Abdominal:      General: Bowel sounds are normal. There is no distension.      Palpations: Abdomen is soft. There is no mass or pulsatile mass.      Tenderness: There is no abdominal tenderness.   Musculoskeletal:         General: No deformity.      Cervical back: Full passive range of motion without pain, normal range of motion and neck supple. No rigidity. No spinous process tenderness or muscular tenderness.      Left knee: Swelling present. Decreased range of motion. Tenderness present.        Legs:    Skin:     General: Skin is warm and dry.      Capillary Refill: Capillary refill takes less than 2 seconds.      Coloration: Skin is not pale.      Findings: No abrasion, bruising, burn, ecchymosis or laceration.   Neurological:      Mental Status: She is alert and oriented to person, place, and time.      GCS: GCS eye subscore is 4. GCS verbal subscore is 5. GCS motor subscore is 6.      Cranial Nerves: No cranial nerve deficit.      Sensory: No sensory deficit.      Motor: No abnormal muscle tone or seizure activity.      Coordination: Coordination normal.   Psychiatric:         Speech: Speech normal.         Behavior: Behavior normal. Behavior is cooperative.         Thought Content: Thought content normal.         Judgment: Judgment normal.        Assessment:      1. Encounter related to worker's compensation claim    2. Sprain of left knee, unspecified ligament, subsequent encounter      Plan:       Medications Ordered This Encounter   Medications    naproxen (NAPROSYN) 500 MG tablet     Sig: Take 1 tablet (500 mg total) by mouth 2 (two) times daily with meals.     Dispense:  20 tablet     Refill:   0     Patient Instructions: Attention not to aggravate affected area, Apply ice 24-48 hours then apply heat/warm soaks, Referral to specialist to be scheduled, once authorized, Use Crutches as directed   Restrictions: Sit or stand as needed, Avoid frequent bending/lifting/twisting, Avoid climbing/kneeling/squatting, No lifting/pushing/pulling more than 10 lbs, No lifting/pushing/pulling more than 25 lbs, Sit down work only, No Prolonged standing/walking, Discharged to Ortho/Neuro/Opthomologist/Surgeon (No walking without crutches.)  Follow up in about 2 weeks (around 5/16/2024) for Recheck.    Please drink plenty of fluids.  Please get plenty of rest.  Please return here or go to the Emergency Department for any concerns or worsening of condition.  If you were prescribed a narcotic medication, do not drive or operate heavy equipment or machinery while taking these medications.  If you were not prescribed an anti-inflammatory medication, and if you do not have any history of stomach/intestinal ulcers, or kidney disease, or are not taking a blood thinner such as Coumadin, Plavix, Pradaxa, Eloquis, or Xaralta for example, it is OK to take over the counter Ibuprofen or Advil or Motrin or Aleve as directed.  Do not take these medications on an empty stomach.    Rest, ice, compression and elevation to the affected joint as needed.    If you were given crutches or they were recommended, use them for ambulation or walking until your follow up visit or recheck.    If you  smoke, please stop smoking.       Please follow up with your primary care doctor or specialist as needed.    Gardenia Velarde MD  [phone]    You must understand that you have received treatment at an Urgent Care facility only, and that you may be  released before all of your medical problems are known or treated. Urgent Care facilities are not equipped to  handle life threatening emergencies. It is recommended that you seek care at an Emergency  Department for  further evaluation of worsening or concerning symptoms, or possibly life threatening conditions as  discussed.

## 2024-05-02 NOTE — PATIENT INSTRUCTIONS
Please drink plenty of fluids.  Please get plenty of rest.  Please return here or go to the Emergency Department for any concerns or worsening of condition.  If you were prescribed a narcotic medication, do not drive or operate heavy equipment or machinery while taking these medications.  If you were not prescribed an anti-inflammatory medication, and if you do not have any history of stomach/intestinal ulcers, or kidney disease, or are not taking a blood thinner such as Coumadin, Plavix, Pradaxa, Eloquis, or Xaralta for example, it is OK to take over the counter Ibuprofen or Advil or Motrin or Aleve as directed.  Do not take these medications on an empty stomach.    Rest, ice, compression and elevation to the affected joint as needed.    If you were given crutches or they were recommended, use them for ambulation or walking until your follow up visit or recheck.    If you  smoke, please stop smoking.       Please follow up with your primary care doctor or specialist as needed.    Gardenia Velarde MD  [phone]    You must understand that you have received treatment at an Urgent Care facility only, and that you may be  released before all of your medical problems are known or treated. Urgent Care facilities are not equipped to  handle life threatening emergencies. It is recommended that you seek care at an Emergency Department for  further evaluation of worsening or concerning symptoms, or possibly life threatening conditions as  discussed.    Knee Sprain    A sprain is an injury to the ligaments or capsule that holds a joint together. There are no broken bones. Most sprains take 3 to 6 weeks to heal. If it a severe sprain where the ligament is completely torn, it can take months to recover.  Most knee sprains are treated with a splint, knee immobilizer brace, or elastic wrap for support. Severe sprains may require surgery.  Home care  Stay off the injured leg as much as possible until you can walk on it  without pain. If you have a lot of pain with walking, crutches or a walker may be prescribed. (These can be rented or purchased at many pharmacies and surgical or orthopedic supply stores). Follow your healthcare provider's advice about when to begin putting weight on that leg.  Keep your leg elevated to reduce pain and swelling. When sleeping, place a pillow under the injured leg. When sitting, support the injured leg so it is level with your waist. This is very important during the first 48 hours.  Apply an ice pack over the injured area for 15 to 20 minutes every 3 to 6 hours. You should do this for the first 24 to 48 hours. You can make an ice pack by filling a plastic bag that seals at the top with ice cubes and then wrapping it with a thin towel. Continue to use ice packs for relief of pain and swelling as needed. As the ice melts, be careful to avoid getting your wrap, splint, or cast wet. After 48 hours, apply heat (warm shower or warm bath) for 15 to 20 minutes several times a day, or alternate ice and heat. You can place the ice pack directly over the splint. If you have to wear a hook-and-loop knee brace, you can open it to apply the ice pack, or heat, directly to the knee. Never put ice directly on the skin. Always wrap the ice in a towel or other type of cloth.  You may use over-the-counter pain medicine to control pain, unless another pain medicine was prescribed.If you have chronic liver or kidney disease or ever had a stomach ulcer or GI bleeding, talk with your healthcare provider before using these medicines.  If you were given a splint, keep it completely dry at all times. Bathe with your splint out of the water, protected with 2 large plastic bags, rubber-banded at the top end. If a fiberglass splint gets wet, you can dry it with a hair dryer. If you have a hook-and-loop knee brace, you can remove this to bathe, unless told otherwise.  Follow-up care  Follow up with your doctor as advised. Any  X-rays you had today dont show any broken bones, breaks, or fractures. Sometimes fractures dont show up on the first X-ray. Bruises and sprains can sometimes hurt as much as a fracture. These injuries can take time to heal completely. If your symptoms dont improve or they get worse, talk with your doctor. You may need a repeat X-ray. If X-rays were taken, you will be told of any new findings that may affect your care.  Call 911  Call 911 if you have:   Shortness of breath   Chest pain  When to seek medical advice  Call your healthcare provider right away if any of these occur:  The splint or knee immobilizer brace becomes wet or soft  The fiberglass cast or splint remains wet for more than 24 hours  Pain or swelling increases  The injured leg or toes become cold, blue, numb, or tingly  Date Last Reviewed: 11/20/2015  © 4771-9894 [employer]. [address] [place] [address]. All rights reserved. This information is not intended as a substitute for professional medical care. Always follow your healthcare professional's instructions.        Knee Pain  Knee pain is very common. Its especially common in active people who put a lot of pressure on their knees, like runners. It affects women more often than men.  Your kneecap (patella) is a thick, round bone. It covers and protects the front portion of your knee joint. It moves along a groove in your thighbone (femur) as part of the patellofemoral joint. A layer of cartilage surrounds the underside of your kneecap. This layer protects it from grinding against your femur.  When this cartilage softens and breaks down, it can cause knee pain. This is partly because of repetitive stress. The stress irritates the lining of the joint. This causes pain in the underlying bone.  What causes knee pain?  Many things can cause knee pain. You may have more than one cause. Some of these include:  Overuse of the knee joint  The kneecap doesnt line up with the  tissue around it  Damage to small nerves in the area  Damage to the ligament-like structure that holds the kneecap in place (retinaculum)  Breakdown of the bone under the cartilage  Swelling in the soft tissues around the kneecap  Injury  You might be more likely to have knee pain if you:  Exercise a lot  Recently increased the intensity of your workouts  Have a body mass index (BMI) greater than 25  Have poor alignment of your kneecap  Walk with your feet turned overly outward or inward  Have weakness in surrounding muscle groups (inner quad or hip adductor muscles)  Have too much tightness in surrounding muscle groups (hamstrings or iliotibial band)  Have a recent history of injury to the area  Are female  Symptoms of knee pain  This type of knee pain is a dull, aching pain in the front of the knee in the area under and around the kneecap. This pain may start quickly or slowly. Your pain might be worse when you squat, run, or sit for a long time. You might also sometimes feel like your knee is giving out. You may have symptoms in one or both of your knees.  Diagnosing knee pain  Your health care provider will ask about your medical history and your symptoms. Be sure to describe any activities that make your knee pain worse. He or she will look at your knee. This will include tests of your range of motion, strength, and areas of pain of your knee. Your knee alignment will be checked.  Your health care provider will need to rule out other causes of your knee pain, such as arthritis. You may need an imaging test, such as an X-ray or MRI.  Treatment for knee pain  Treatments that can help ease your symptoms may include:  Avoiding activities for a while that make your pain worse, returning to activity over time  Icing the outside of your knee when it causes you pain  Taking over-the-counter pain medicine  Wearing a knee brace or taping your knee to support it  Wearing special shoe inserts to help keep your feet in the  proper alignment  Doing special exercises to stretch and strengthen the muscles around your hip and your knee  These steps help most people manage knee pain. But some cases of knee pain need to be treated with surgery. You may need surgery right away. Or you may need it later if other treatments dont work. Your health care provider may refer you to an orthopedic surgeon. He or she will talk with you about your choices.  Preventing knee pain  Losing weight and correcting excess muscle tightness or muscle weakness may help lower your risk.  In some cases, you can prevent knee pain. To help prevent a flare-up of knee pain, you do these things:  Regularly do all the exercises your doctor or physical therapist advises  Support your knee as advised by your doctor or physical therapist  Increase training gradually, and ease up on training when needed  Have an expert check your gait for running or other sporting activities  Stretch properly before and after exercise  Replace your running shoes regularly  Lose excess weight     When to call your health care provider  Call your health care provider right away if:  Your symptoms dont get better after a few weeks of treatment  You have any new symptoms   Date Last Reviewed: 3/19/2015  © 4082-4793 [employer]. [address] [place] [id number]. All rights reserved. This information is not intended as a substitute for professional medical care. Always follow your healthcare professional's instructions.      Discharge Instructions: Using Crutches (Non-Weight-Bearing)  Your healthcare provider has prescribed crutches for you. A healthy leg can support your body weight, but when you have an injured leg or foot, you need to keep weight off it. The swing to method of walking, sometimes called gait, is easy to learn and takes less arm strength and balance. The swing through gait takes more practice, but it moves you farther with each step and is less tiring  overall. Start with swing to and progress to swing through when instructed.      Before you use crutches  Be prepared:  Remove throw rugs, electrical cords, and anything else that may cause you to fall.  Arrange your household to keep the items you need handy. Keep everything else out of the way.  Find a backpack, evans pack, or apron, or use pockets to carry things. This will help you keep your hands free.  Standing with crutches  Use the balanced standing (tripod) position when you start or end a movement. Also use it whenever youre standing for a length of time.  Move your crutches in front of you about 12 inches.  Hold the injured (or weaker) foot off the floor.  Find your balance.  Be sure not to rest your armpits on the pads of the crutches.  Walking with crutches  Tips include the following:   Start in a balanced standing (tripod) position.  Squeeze the crutch pads against the sides of your chest.  The bottom tips of the crutches should be wide enough apart for you to move easily between them.  Support your weight on your hands, not on your armpits.  Swing to gait  With your crutches in front of you, press down on the handgrips.  Lift your good (stronger) foot and swing your body up to the crutches.  Land on your good foot, between your crutches.  Keep the knee of your injured or weaker foot slightly bent.  Reach forward and out with the crutches to begin the next step.  Swing through gait  With your crutches in front of you, press down on the handgrips.  Lift your good (stronger) foot and swing your body through the crutches.  Land on your good foot, about 12 inches in front of the crutches.  Keep the knee of your injured leg slightly bent.  Reach forward and out with the crutches to begin the next step.  Follow-up  Make a follow-up appointment as directed by your healthcare provider.     When to call your healthcare provider  Call your healthcare provider right away if you have any of the  following:  Sudden or increased shortness of breath  Sudden chest pain  Fever above 100.4°F (38.0°C)  Increasing redness, tenderness, or swelling at the incision site or in the injured limb  Drainage from the incision or injured limb  Opening of the incision or injury  Localized chest pain with coughing   Date Last Reviewed: 8/1/2016  © 8366-0743 [employer]. [address] [place] [address]. All rights reserved. This information is not intended as a substitute for professional medical care. Always follow your healthcare professional's instructions.